# Patient Record
Sex: FEMALE | Race: BLACK OR AFRICAN AMERICAN | NOT HISPANIC OR LATINO
[De-identification: names, ages, dates, MRNs, and addresses within clinical notes are randomized per-mention and may not be internally consistent; named-entity substitution may affect disease eponyms.]

---

## 2018-09-19 PROBLEM — Z00.00 ENCOUNTER FOR PREVENTIVE HEALTH EXAMINATION: Status: ACTIVE | Noted: 2018-09-19

## 2020-07-11 ENCOUNTER — APPOINTMENT (OUTPATIENT)
Dept: ORTHOPEDIC SURGERY | Facility: CLINIC | Age: 55
End: 2020-07-11
Payer: MEDICAID

## 2020-07-11 VITALS
DIASTOLIC BLOOD PRESSURE: 103 MMHG | HEART RATE: 104 BPM | BODY MASS INDEX: 31.34 KG/M2 | HEIGHT: 66 IN | SYSTOLIC BLOOD PRESSURE: 177 MMHG | WEIGHT: 195 LBS

## 2020-07-11 VITALS — TEMPERATURE: 97 F

## 2020-07-11 DIAGNOSIS — M47.816 SPONDYLOSIS W/OUT MYELOPATHY OR RADICULOPATHY, LUMBAR REGION: ICD-10-CM

## 2020-07-11 PROCEDURE — 99204 OFFICE O/P NEW MOD 45 MIN: CPT

## 2020-07-11 PROCEDURE — 72100 X-RAY EXAM L-S SPINE 2/3 VWS: CPT

## 2020-07-11 NOTE — ADDENDUM
[FreeTextEntry1] : Documented by Malcolm Real acting as a scribe for Dr. Fadi Irving on 07/11/2020 . All medical record entries made by the Scribe were at my, Dr. Fadi Irving, direction and personally dictated by me on 07/11/2020 . I have reviewed the chart and agree that the record accurately reflects my personal performance of the history, physical exam, assessment and plan. I have also personally directed, reviewed, and agreed with the chart.

## 2020-07-11 NOTE — DISCUSSION/SUMMARY
[de-identified] : I have recommended that the pt continue with a conservative treatment plan before opting for a large lumbar fusion surgery. With a complete of low back pain, I have explained to this patient that this type of surgery may help with her pain profile, but will likely not 100% rectify her low back pain. Pt has been referred to physical therapy for decreased pain modalities, core strengthening modalities, soft tissue modalities, and physical modalities. Pt will be referred to pain management for epidural injections. Pt can take Tylenol / anti-inflammatories PRN pain. The pt may follow up here on a PRN basis. If her signs and symptoms persist, pt can follow up in 2-3 months.
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
## 2020-09-27 PROBLEM — Z00.00 ENCOUNTER FOR PREVENTIVE HEALTH EXAMINATION: Status: ACTIVE | Noted: 2020-09-27

## 2020-09-28 PROBLEM — Z78.9 DENIES ALCOHOL CONSUMPTION: Status: ACTIVE | Noted: 2020-09-28

## 2020-09-28 PROBLEM — I10 HTN (HYPERTENSION): Status: ACTIVE | Noted: 2020-09-28

## 2020-09-28 PROBLEM — E78.5 HLD (HYPERLIPIDEMIA): Status: ACTIVE | Noted: 2020-09-28

## 2020-09-28 PROBLEM — A63.0 CONDYLOMA: Status: ACTIVE | Noted: 2020-09-28

## 2020-09-28 PROBLEM — N89.8 VAGINAL LESION: Status: ACTIVE | Noted: 2020-09-28

## 2020-09-28 PROBLEM — E11.9 DIABETES TYPE 2, CONTROLLED: Status: ACTIVE | Noted: 2020-09-28

## 2020-09-28 RX ORDER — METFORMIN HYDROCHLORIDE 625 MG/1
TABLET ORAL
Refills: 0 | Status: ACTIVE | COMMUNITY

## 2020-09-28 RX ORDER — BISOPROLOL FUMARATE 5 MG/1
5 TABLET, FILM COATED ORAL
Refills: 0 | Status: ACTIVE | COMMUNITY

## 2020-09-28 RX ORDER — ATORVASTATIN CALCIUM 80 MG/1
TABLET, FILM COATED ORAL
Refills: 0 | Status: ACTIVE | COMMUNITY

## 2020-09-29 ENCOUNTER — APPOINTMENT (OUTPATIENT)
Dept: GYNECOLOGIC ONCOLOGY | Facility: CLINIC | Age: 55
End: 2020-09-29
Payer: MEDICAID

## 2020-09-29 VITALS — WEIGHT: 200 LBS | BODY MASS INDEX: 32.14 KG/M2 | HEIGHT: 66 IN

## 2020-09-29 DIAGNOSIS — N89.8 OTHER SPECIFIED NONINFLAMMATORY DISORDERS OF VAGINA: ICD-10-CM

## 2020-09-29 DIAGNOSIS — I10 ESSENTIAL (PRIMARY) HYPERTENSION: ICD-10-CM

## 2020-09-29 DIAGNOSIS — Z78.9 OTHER SPECIFIED HEALTH STATUS: ICD-10-CM

## 2020-09-29 DIAGNOSIS — E78.5 HYPERLIPIDEMIA, UNSPECIFIED: ICD-10-CM

## 2020-09-29 DIAGNOSIS — A63.0 ANOGENITAL (VENEREAL) WARTS: ICD-10-CM

## 2020-09-29 DIAGNOSIS — E11.9 TYPE 2 DIABETES MELLITUS W/OUT COMPLICATIONS: ICD-10-CM

## 2020-09-29 PROCEDURE — 76830 TRANSVAGINAL US NON-OB: CPT | Mod: 59

## 2020-09-29 PROCEDURE — 76857 US EXAM PELVIC LIMITED: CPT | Mod: 59

## 2020-09-29 PROCEDURE — 99205 OFFICE O/P NEW HI 60 MIN: CPT | Mod: 25

## 2020-09-30 NOTE — HISTORY OF PRESENT ILLNESS
[FreeTextEntry1] : 56yo  LMP 2020 presents with referral from Dr. Duenas for perineal lesion. Pt. reports lesion was first noticed about 6 months ago upon washing herself and was noted to be irritated with bleeding after wiping. She would like for it to be removed and was therefore referred here today. Per Dr. Floyd note, biopsy of lesion was taken in 2020 and was reported as atypical, however, he believes per examination lesion appears to most likely represent a condyloma. She also reports some pelvic pain off and on for the past few years but denies all other ROS. \par \par LPAP- , normal.\par LMammo- September, normal\par LColonoscopy- never, she has GI physici\par LBone Density Scan- never\par

## 2020-09-30 NOTE — CHIEF COMPLAINT
[FreeTextEntry1] : [facility]\par \par [facility] [phone] at [address] [place] [phone]\par

## 2020-09-30 NOTE — PHYSICAL EXAM
[Normal] : External genitalia: Normal, no lesions appreciated [Abnormal] : Anus, perineum, and rectum: Abnormal [de-identified] : raised lesions noted on the right vulva, right perineal fold, clitoral russo and perineal body.  [de-identified] : 1cm nodule likely a sebaceous cyst on the left perineal skin [de-identified] : Patient was interviewed and examined with chaperone present. Name of Chaperone: Jocelyn Eckert PA-C

## 2020-09-30 NOTE — LETTER BODY
[FreeTextEntry2] : Name: COBY VANG \par : Aug  6 1965 \par \par Date of Visit: Sep 29, 2020 \par \par Dear Dr. Duenas\par \par I recently saw our mutual patient, COBY VANG , in my office.\par \par Below, please see my findings.\par \par As always, it is my sincere pleasure to have the opportunity to participate in one of your patients' care. Please feel free to contact me with any questions or concerns that you may have regarding her care.\par \par Sincerely yours,\par \par Khanh Heller MD\par

## 2020-09-30 NOTE — ASSESSMENT
[FreeTextEntry1] : 56yo with perineal lesion. \par \par I discussed at length with the patient the risks, benefits, and alternatives to Wide local excision of R. vulvar, Right perineal fold, clitoral russo and perineal body lesions. Incision and drainage of left perineal skin.   She understands the risks to include (but not be limited to):  infection with need for hospitalization and bleeding with need for transfusion.  The patient agrees to proceed.  \par \par

## 2020-09-30 NOTE — END OF VISIT
[FreeTextEntry3] : Written by Nathaly Eckert PA-C, acting as scribe for Dr. Khanh Heller.\par  [FreeTextEntry2] : This note accurately reflects the work and decisions made by me.\par

## 2020-09-30 NOTE — REVIEW OF SYSTEMS
[Negative] : Musculoskeletal [Hematuria] : no hematuria [Dysuria] : no dysuria [Vaginal Discharge] : no vaginal discharge [Abn Vag Bleeding] : no abnormal vaginal bleeding [Dyspareunia] : no dyspareunia [Incontinence] : no incontinence [FreeTextEntry4] : perineal growth with occasional bleeding noted upon wiping, pelvic pain

## 2020-10-12 ENCOUNTER — RESULT REVIEW (OUTPATIENT)
Age: 55
End: 2020-10-12

## 2020-10-12 ENCOUNTER — OUTPATIENT (OUTPATIENT)
Dept: OUTPATIENT SERVICES | Facility: HOSPITAL | Age: 55
LOS: 1 days | End: 2020-10-12
Payer: COMMERCIAL

## 2020-10-12 VITALS
HEIGHT: 66 IN | HEART RATE: 85 BPM | DIASTOLIC BLOOD PRESSURE: 80 MMHG | TEMPERATURE: 98 F | SYSTOLIC BLOOD PRESSURE: 150 MMHG | RESPIRATION RATE: 16 BRPM | WEIGHT: 195.11 LBS

## 2020-10-12 DIAGNOSIS — Z01.818 ENCOUNTER FOR OTHER PREPROCEDURAL EXAMINATION: ICD-10-CM

## 2020-10-12 DIAGNOSIS — Z98.891 HISTORY OF UTERINE SCAR FROM PREVIOUS SURGERY: Chronic | ICD-10-CM

## 2020-10-12 DIAGNOSIS — Z29.9 ENCOUNTER FOR PROPHYLACTIC MEASURES, UNSPECIFIED: ICD-10-CM

## 2020-10-12 DIAGNOSIS — I10 ESSENTIAL (PRIMARY) HYPERTENSION: ICD-10-CM

## 2020-10-12 DIAGNOSIS — C51.9 MALIGNANT NEOPLASM OF VULVA, UNSPECIFIED: ICD-10-CM

## 2020-10-12 LAB
A1C WITH ESTIMATED AVERAGE GLUCOSE RESULT: 10.8 % — HIGH (ref 4–5.6)
ANION GAP SERPL CALC-SCNC: 12 MMOL/L — SIGNIFICANT CHANGE UP (ref 5–17)
BASOPHILS # BLD AUTO: 0.05 K/UL — SIGNIFICANT CHANGE UP (ref 0–0.2)
BASOPHILS NFR BLD AUTO: 0.7 % — SIGNIFICANT CHANGE UP (ref 0–2)
BLD GP AB SCN SERPL QL: SIGNIFICANT CHANGE UP
BUN SERPL-MCNC: 9 MG/DL — SIGNIFICANT CHANGE UP (ref 8–20)
CALCIUM SERPL-MCNC: 9.5 MG/DL — SIGNIFICANT CHANGE UP (ref 8.6–10.2)
CHLORIDE SERPL-SCNC: 100 MMOL/L — SIGNIFICANT CHANGE UP (ref 98–107)
CO2 SERPL-SCNC: 26 MMOL/L — SIGNIFICANT CHANGE UP (ref 22–29)
CREAT SERPL-MCNC: 0.4 MG/DL — LOW (ref 0.5–1.3)
EOSINOPHIL # BLD AUTO: 0.11 K/UL — SIGNIFICANT CHANGE UP (ref 0–0.5)
EOSINOPHIL NFR BLD AUTO: 1.6 % — SIGNIFICANT CHANGE UP (ref 0–6)
ESTIMATED AVERAGE GLUCOSE: 263 MG/DL — HIGH (ref 68–114)
GLUCOSE SERPL-MCNC: 229 MG/DL — HIGH (ref 70–99)
HCG SERPL-ACNC: <4 MIU/ML — SIGNIFICANT CHANGE UP
HCT VFR BLD CALC: 45.1 % — HIGH (ref 34.5–45)
HGB BLD-MCNC: 14 G/DL — SIGNIFICANT CHANGE UP (ref 11.5–15.5)
IMM GRANULOCYTES NFR BLD AUTO: 0.3 % — SIGNIFICANT CHANGE UP (ref 0–1.5)
LYMPHOCYTES # BLD AUTO: 2.65 K/UL — SIGNIFICANT CHANGE UP (ref 1–3.3)
LYMPHOCYTES # BLD AUTO: 37.5 % — SIGNIFICANT CHANGE UP (ref 13–44)
MCHC RBC-ENTMCNC: 26.4 PG — LOW (ref 27–34)
MCHC RBC-ENTMCNC: 31 GM/DL — LOW (ref 32–36)
MCV RBC AUTO: 85.1 FL — SIGNIFICANT CHANGE UP (ref 80–100)
MONOCYTES # BLD AUTO: 0.55 K/UL — SIGNIFICANT CHANGE UP (ref 0–0.9)
MONOCYTES NFR BLD AUTO: 7.8 % — SIGNIFICANT CHANGE UP (ref 2–14)
NEUTROPHILS # BLD AUTO: 3.69 K/UL — SIGNIFICANT CHANGE UP (ref 1.8–7.4)
NEUTROPHILS NFR BLD AUTO: 52.1 % — SIGNIFICANT CHANGE UP (ref 43–77)
PLATELET # BLD AUTO: 231 K/UL — SIGNIFICANT CHANGE UP (ref 150–400)
POTASSIUM SERPL-MCNC: 4.5 MMOL/L — SIGNIFICANT CHANGE UP (ref 3.5–5.3)
POTASSIUM SERPL-SCNC: 4.5 MMOL/L — SIGNIFICANT CHANGE UP (ref 3.5–5.3)
RBC # BLD: 5.3 M/UL — HIGH (ref 3.8–5.2)
RBC # FLD: 13.3 % — SIGNIFICANT CHANGE UP (ref 10.3–14.5)
SODIUM SERPL-SCNC: 138 MMOL/L — SIGNIFICANT CHANGE UP (ref 135–145)
WBC # BLD: 7.07 K/UL — SIGNIFICANT CHANGE UP (ref 3.8–10.5)
WBC # FLD AUTO: 7.07 K/UL — SIGNIFICANT CHANGE UP (ref 3.8–10.5)

## 2020-10-12 PROCEDURE — 71046 X-RAY EXAM CHEST 2 VIEWS: CPT | Mod: 26

## 2020-10-12 PROCEDURE — 93010 ELECTROCARDIOGRAM REPORT: CPT

## 2020-10-12 RX ORDER — CEFOTETAN DISODIUM 1 G
2 VIAL (EA) INJECTION ONCE
Refills: 0 | Status: DISCONTINUED | OUTPATIENT
Start: 2020-11-06 | End: 2020-11-20

## 2020-10-12 NOTE — H&P PST ADULT - NSICDXFAMILYHX_GEN_ALL_CORE_FT
FAMILY HISTORY:  Father  Still living? No  FH: diabetes mellitus, Age at diagnosis: Age Unknown    Sibling  Still living? Yes, Estimated age: Age Unknown  FH: diabetes mellitus, Age at diagnosis: Age Unknown

## 2020-10-12 NOTE — H&P PST ADULT - NSICDXPROBLEM_GEN_ALL_CORE_FT
PROBLEM DIAGNOSES  Problem: Need for prophylactic measure  Assessment and Plan: moderate risk surgical team to determine prophylactic intervention     Problem: Malignant neoplasm of vulva  Assessment and Plan: Vulvectomy, partial simple     Problem: Hypertension  Assessment and Plan: f/u with PCP for medical clearance

## 2020-10-12 NOTE — H&P PST ADULT - ASSESSMENT
54 y/o female postmenopausal,  with HTN. DM Type 2, HLD, Condyloma,  malignant neoplasm of vulva seen today pre-op for Vulvectomy, partial simple. Pt report perineal itching and lesion was noticed when she was washing herself. Pt was seen by her PCP and OB-GYN, underwent a Pap smear and biopsy in  with abnormal. Pt report intermittent perineal pain persist, denies bleeding, denies personal and family hx of malignant neoplasm. Seen today for  scheduled surgery with Dr. Urh. CAPRINI VTE 2.0 SCORE [CLOT updated 2019]    AGE RELATED RISK FACTORS                                                       MOBILITY RELATED FACTORS  [ x] Age 41-60 years                                            (1 Point)                    [ ] Bed rest                                                        (1 Point)  [ ] Age: 61-74 years                                           (2 Points)                  [ ] Plaster cast                                                   (2 Points)  [ ] Age= 75 years                                              (3 Points)                    [ ] Bed bound for more than 72 hours                 (2 Points)    DISEASE RELATED RISK FACTORS                                               GENDER SPECIFIC FACTORS  [ ] Edema in the lower extremities                       (1 Point)              [ ] Pregnancy                                                     (1 Point)  [ ] Varicose veins                                               (1 Point)                     [ ] Post-partum < 6 weeks                                   (1 Point)             [x ] BMI > 25 Kg/m2                                            (1 Point)                     [ ] Hormonal therapy  or oral contraception          (1 Point)                 [ ] Sepsis (in the previous month)                        (1 Point)               [ ] History of pregnancy complications                 (1 point)  [ ] Pneumonia or serious lung disease                                               [ ] Unexplained or recurrent                     (1 Point)           (in the previous month)                               (1 Point)  [ ] Abnormal pulmonary function test                     (1 Point)                 SURGERY RELATED RISK FACTORS  [ ] Acute myocardial infarction                              (1 Point)               [ ]  Section                                             (1 Point)  [ ] Congestive heart failure (in the previous month)  (1 Point)      [ ] Minor surgery                                                  (1 Point)   [ ] Inflammatory bowel disease                             (1 Point)               [ ] Arthroscopic surgery                                        (2 Points)  [ ] Central venous access                                      (2 Points)                [x ] General surgery lasting more than 45 minutes (2 points)  [ x] Malignancy- Present or previous                   (2 Points)                [ ] Elective arthroplasty                                         (5 points)    [ ] Stroke (in the previous month)                          (5 Points)                                                                                                                                                           HEMATOLOGY RELATED FACTORS                                                 TRAUMA RELATED RISK FACTORS  [ ] Prior episodes of VTE                                     (3 Points)                [ ] Fracture of the hip, pelvis, or leg                       (5 Points)  [ ] Positive family history for VTE                         (3 Points)             [ ] Acute spinal cord injury (in the previous month)  (5 Points)  [ ] Prothrombin 70076 A                                     (3 Points)               [ ] Paralysis  (less than 1 month)                             (5 Points)  [ ] Factor V Leiden                                             (3 Points)                  [ ] Multiple Trauma within 1 month                        (5 Points)  [ ] Lupus anticoagulants                                     (3 Points)                                                           [ ] Anticardiolipin antibodies                               (3 Points)                                                       [ ] High homocysteine in the blood                      (3 Points)                                             [ ] Other congenital or acquired thrombophilia      (3 Points)                                                [ ] Heparin induced thrombocytopenia                  (3 Points)                                     Total Score [     6     ]  OPIOID RISK TOOL    MICHELLE EACH BOX THAT APPLIES AND ADD TOTALS AT THE END    FAMILY HISTORY OF SUBSTANCE ABUSE                 FEMALE         MALE                                                Alcohol                             [  ]1 pt          [  ]3pts                                               Illegal Durgs                     [  ]2 pts        [  ]3pts                                               Rx Drugs                           [  ]4 pts        [  ]4 pts    PERSONAL HISTORY OF SUBSTANCE ABUSE                                                                                          Alcohol                             [  ]3 pts       [  ]3 pts                                               Illegal Drugs                     [  ]4 pts        [  ]4 pts                                               Rx Drugs                           [  ]5 pts        [  ]5 pts    AGE BETWEEN 16-45 YEARS                                      [  ]1 pt         [  ]1 pt    HISTORY OF PREADOLESCENT   SEXUAL ABUSE                                                             [  ]3 pts        [  ]0pts    PSYCHOLOGICAL DISEASE                     ADD, OCD, Bipolar, Schizophrenia        [  ]2 pts         [  ]2 pts                      Depression                                               [  x]1 pt           [  ]1 pt           SCORING TOTAL   (add numbers and type here)              (**1*)                                     A score of 3 or lower indicated LOW risk for future opioid abuse  A score of 4 to 7 indicated moderate risk for future opioid abuse  A score of 8 or higher indicates a high risk for opioid abuse 54 y/o female postmenopausal,  with HTN. DM Type 2, HLD, positive QuantiFeron gold,(2020 pt on  Rifampin), Condyloma,  malignant neoplasm of vulva seen today pre-op for Vulvectomy, partial simple. Pt report perineal itching and lesion was noticed when she was washing herself. Pt was seen by her PCP and OB-GYN, underwent a Pap smear and biopsy in  with abnormal. Pt report intermittent perineal pain persist, denies bleeding, denies personal and family hx of malignant neoplasm. Seen today for  scheduled surgery with Dr. Urh. CAPRINI VTE 2.0 SCORE [CLOT updated 2019]    AGE RELATED RISK FACTORS                                                       MOBILITY RELATED FACTORS  [ x] Age 41-60 years                                            (1 Point)                    [ ] Bed rest                                                        (1 Point)  [ ] Age: 61-74 years                                           (2 Points)                  [ ] Plaster cast                                                   (2 Points)  [ ] Age= 75 years                                              (3 Points)                    [ ] Bed bound for more than 72 hours                 (2 Points)    DISEASE RELATED RISK FACTORS                                               GENDER SPECIFIC FACTORS  [ ] Edema in the lower extremities                       (1 Point)              [ ] Pregnancy                                                     (1 Point)  [ ] Varicose veins                                               (1 Point)                     [ ] Post-partum < 6 weeks                                   (1 Point)             [x ] BMI > 25 Kg/m2                                            (1 Point)                     [ ] Hormonal therapy  or oral contraception          (1 Point)                 [ ] Sepsis (in the previous month)                        (1 Point)               [ ] History of pregnancy complications                 (1 point)  [ ] Pneumonia or serious lung disease                                               [ ] Unexplained or recurrent                     (1 Point)           (in the previous month)                               (1 Point)  [ ] Abnormal pulmonary function test                     (1 Point)                 SURGERY RELATED RISK FACTORS  [ ] Acute myocardial infarction                              (1 Point)               [ ]  Section                                             (1 Point)  [ ] Congestive heart failure (in the previous month)  (1 Point)      [ ] Minor surgery                                                  (1 Point)   [ ] Inflammatory bowel disease                             (1 Point)               [ ] Arthroscopic surgery                                        (2 Points)  [ ] Central venous access                                      (2 Points)                [x ] General surgery lasting more than 45 minutes (2 points)  [ x] Malignancy- Present or previous                   (2 Points)                [ ] Elective arthroplasty                                         (5 points)    [ ] Stroke (in the previous month)                          (5 Points)                                                                                                                                                           HEMATOLOGY RELATED FACTORS                                                 TRAUMA RELATED RISK FACTORS  [ ] Prior episodes of VTE                                     (3 Points)                [ ] Fracture of the hip, pelvis, or leg                       (5 Points)  [ ] Positive family history for VTE                         (3 Points)             [ ] Acute spinal cord injury (in the previous month)  (5 Points)  [ ] Prothrombin 26061 A                                     (3 Points)               [ ] Paralysis  (less than 1 month)                             (5 Points)  [ ] Factor V Leiden                                             (3 Points)                  [ ] Multiple Trauma within 1 month                        (5 Points)  [ ] Lupus anticoagulants                                     (3 Points)                                                           [ ] Anticardiolipin antibodies                               (3 Points)                                                       [ ] High homocysteine in the blood                      (3 Points)                                             [ ] Other congenital or acquired thrombophilia      (3 Points)                                                [ ] Heparin induced thrombocytopenia                  (3 Points)                                     Total Score [     6     ]  OPIOID RISK TOOL    MICHELLE EACH BOX THAT APPLIES AND ADD TOTALS AT THE END    FAMILY HISTORY OF SUBSTANCE ABUSE                 FEMALE         MALE                                                Alcohol                             [  ]1 pt          [  ]3pts                                               Illegal Durgs                     [  ]2 pts        [  ]3pts                                               Rx Drugs                           [  ]4 pts        [  ]4 pts    PERSONAL HISTORY OF SUBSTANCE ABUSE                                                                                          Alcohol                             [  ]3 pts       [  ]3 pts                                               Illegal Drugs                     [  ]4 pts        [  ]4 pts                                               Rx Drugs                           [  ]5 pts        [  ]5 pts    AGE BETWEEN 16-45 YEARS                                      [  ]1 pt         [  ]1 pt    HISTORY OF PREADOLESCENT   SEXUAL ABUSE                                                             [  ]3 pts        [  ]0pts    PSYCHOLOGICAL DISEASE                     ADD, OCD, Bipolar, Schizophrenia        [  ]2 pts         [  ]2 pts                      Depression                                               [  x]1 pt           [  ]1 pt           SCORING TOTAL   (add numbers and type here)              (**1*)                                     A score of 3 or lower indicated LOW risk for future opioid abuse  A score of 4 to 7 indicated moderate risk for future opioid abuse  A score of 8 or higher indicates a high risk for opioid abuse

## 2020-10-12 NOTE — H&P PST ADULT - NSICDXPASTMEDICALHX_GEN_ALL_CORE_FT
PAST MEDICAL HISTORY:  DM (diabetes mellitus)     Hyperlipemia     Hypertension     Malignant neoplasm of vulva      PAST MEDICAL HISTORY:  DM (diabetes mellitus) newly dx August 2020  with AIC 13.    Hyperlipemia     Hypertension     Malignant neoplasm of vulva      PAST MEDICAL HISTORY:  DM (diabetes mellitus) newly dx August 2020  with AIC 13.    Fibroid uterus     Hyperlipemia     Hypertension     Malignant neoplasm of vulva      PAST MEDICAL HISTORY:  DM (diabetes mellitus) newly dx August 2020  with AIC 13.    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

## 2020-10-12 NOTE — H&P PST ADULT - LAB RESULTS AND INTERPRETATION
10/12/2020. Pt lab reviewed, Hgb AIC 10.8, copies of lab results faxed to PCP and Dr. Heller's office today. Spoke to Mio in Dr. Heller's office and Kamryn in PCP office.

## 2020-10-12 NOTE — H&P PST ADULT - HISTORY OF PRESENT ILLNESS
56 y/o female postmenopausal,  with HTN. DM Type 2, HLD, Condyloma,  malignant neoplasm of vulva seen today pre-op for Vulvectomy, partial simple. Pt report perineal itching and lesion was noticed when she was washing herself. Pt was seen by her PCP and OB-GYN, underwent a Pap smear and biopsy in August with abnormal. Pt report intermittent perineal pain persist, denies bleeding. Seen today for pre-op for a scheduled surgery.             56 y/o female postmenopausal,  with HTN. DM Type 2, HLD, Condyloma,  malignant neoplasm of vulva seen today pre-op for Vulvectomy, partial simple. Pt report perineal itching and lesion was noticed when she was washing herself. Pt was seen by her PCP and OB-GYN, underwent a Pap smear and biopsy in  with abnormal. Pt report intermittent perineal pain persist, denies bleeding, denies personal and family hx of malignant neoplasm. Seen today for pre-op for a scheduled surgery.             56 y/o female postmenopausal,  with HTN. DM Type 2, HLD, Positive QuantiFeron gold(2020, pt on Rifampin),  Condyloma,  malignant neoplasm of vulva seen today pre-op for Vulvectomy, partial simple. Pt report perineal itching and lesion was noticed when she was washing herself. Pt was seen by her PCP and OB-GYN, underwent a Pap smear and biopsy in  with abnormal. Pt report intermittent perineal pain persist, denies bleeding, denies personal and family hx of malignant neoplasm. Seen today for pre-op for a scheduled surgery.

## 2020-10-15 DIAGNOSIS — Z01.818 ENCOUNTER FOR OTHER PREPROCEDURAL EXAMINATION: ICD-10-CM

## 2020-10-16 ENCOUNTER — APPOINTMENT (OUTPATIENT)
Dept: DISASTER EMERGENCY | Facility: CLINIC | Age: 55
End: 2020-10-16

## 2020-11-02 NOTE — ASU PATIENT PROFILE, ADULT - LEARNING ASSESSMENT (PATIENT) ADDITIONAL COMMENTS
pre-op instrucitons & covid testing reviewed. 11/2/20: LMOM. pre-op instrucitons & covid testing reviewed. 11/2/20: NAOMIOM. 11-3-2020 updated via phone pt denies any change pre-op instructions reviewed

## 2020-11-02 NOTE — ASU PATIENT PROFILE, ADULT - PMH
DM (diabetes mellitus)  newly dx August 2020  with AIC 13.  Fibroid uterus    Hyperlipemia    Hypertension    Malignant neoplasm of vulva    Positive QuantiFERON-TB Gold test  8/2020 on Rifampin

## 2020-11-03 ENCOUNTER — APPOINTMENT (OUTPATIENT)
Dept: DISASTER EMERGENCY | Facility: CLINIC | Age: 55
End: 2020-11-03

## 2020-11-04 LAB — SARS-COV-2 N GENE NPH QL NAA+PROBE: NOT DETECTED

## 2020-11-06 ENCOUNTER — OUTPATIENT (OUTPATIENT)
Dept: INPATIENT UNIT | Facility: HOSPITAL | Age: 55
LOS: 1 days | End: 2020-11-06
Payer: COMMERCIAL

## 2020-11-06 ENCOUNTER — RESULT REVIEW (OUTPATIENT)
Age: 55
End: 2020-11-06

## 2020-11-06 VITALS
HEART RATE: 80 BPM | OXYGEN SATURATION: 97 % | DIASTOLIC BLOOD PRESSURE: 92 MMHG | RESPIRATION RATE: 16 BRPM | SYSTOLIC BLOOD PRESSURE: 150 MMHG

## 2020-11-06 VITALS
HEIGHT: 66 IN | SYSTOLIC BLOOD PRESSURE: 128 MMHG | WEIGHT: 195.11 LBS | OXYGEN SATURATION: 100 % | HEART RATE: 105 BPM | DIASTOLIC BLOOD PRESSURE: 79 MMHG | RESPIRATION RATE: 18 BRPM | TEMPERATURE: 98 F

## 2020-11-06 DIAGNOSIS — Z98.891 HISTORY OF UTERINE SCAR FROM PREVIOUS SURGERY: Chronic | ICD-10-CM

## 2020-11-06 DIAGNOSIS — C51.9 MALIGNANT NEOPLASM OF VULVA, UNSPECIFIED: ICD-10-CM

## 2020-11-06 LAB
GLUCOSE BLDC GLUCOMTR-MCNC: 149 MG/DL — HIGH (ref 70–99)
GLUCOSE BLDC GLUCOMTR-MCNC: 205 MG/DL — HIGH (ref 70–99)

## 2020-11-06 PROCEDURE — 88305 TISSUE EXAM BY PATHOLOGIST: CPT | Mod: 26

## 2020-11-06 PROCEDURE — 88309 TISSUE EXAM BY PATHOLOGIST: CPT | Mod: 26

## 2020-11-06 PROCEDURE — 56620 VULVECTOMY SIMPLE PARTIAL: CPT

## 2020-11-06 RX ORDER — HYDROMORPHONE HYDROCHLORIDE 2 MG/ML
0.5 INJECTION INTRAMUSCULAR; INTRAVENOUS; SUBCUTANEOUS
Refills: 0 | Status: DISCONTINUED | OUTPATIENT
Start: 2020-11-06 | End: 2020-11-06

## 2020-11-06 RX ORDER — ACETAMINOPHEN 500 MG
3 TABLET ORAL
Qty: 84 | Refills: 0
Start: 2020-11-06 | End: 2020-11-12

## 2020-11-06 RX ORDER — FENTANYL CITRATE 50 UG/ML
25 INJECTION INTRAVENOUS
Refills: 0 | Status: DISCONTINUED | OUTPATIENT
Start: 2020-11-06 | End: 2020-11-06

## 2020-11-06 RX ORDER — SODIUM CHLORIDE 9 MG/ML
1000 INJECTION, SOLUTION INTRAVENOUS
Refills: 0 | Status: DISCONTINUED | OUTPATIENT
Start: 2020-11-06 | End: 2020-11-06

## 2020-11-06 RX ORDER — ONDANSETRON 8 MG/1
4 TABLET, FILM COATED ORAL ONCE
Refills: 0 | Status: DISCONTINUED | OUTPATIENT
Start: 2020-11-06 | End: 2020-11-06

## 2020-11-06 RX ADMIN — HYDROMORPHONE HYDROCHLORIDE 0.5 MILLIGRAM(S): 2 INJECTION INTRAMUSCULAR; INTRAVENOUS; SUBCUTANEOUS at 13:47

## 2020-11-06 NOTE — BRIEF OPERATIVE NOTE - NSICDXBRIEFPROCEDURE_GEN_ALL_CORE_FT
PROCEDURES:  Wide local excision of vulva with colposcopy of vulva 06-Nov-2020 13:03:27  Ghazla Ferrera  Exam under anesthesia of perineum 06-Nov-2020 13:01:30  Ghazal Ferrera

## 2020-11-06 NOTE — ASU DISCHARGE PLAN (ADULT/PEDIATRIC) - CARE PROVIDER_API CALL
Khanh Heller)  [facility]  [address] [place] [address]  Phone: [phone]  Fax: [phone]  Follow Up Time: 2 weeks

## 2020-11-06 NOTE — ASU DISCHARGE PLAN (ADULT/PEDIATRIC) - MEDICATION INSTRUCTIONS
Take 500mg of naproxen every 12 hours for one week. Take up to 975mg of tylenol every 6 hours as needed. If you need more pain medication please call the office

## 2020-11-06 NOTE — BRIEF OPERATIVE NOTE - SPECIMENS
1. Clitoral russo 2. Right vulva at 8 o'clock 3. Right vulva at 9 o'clock 4. Vulva at 6 o'clock 5. Right thigh 6. Left thigh

## 2020-11-11 LAB — SURGICAL PATHOLOGY STUDY: SIGNIFICANT CHANGE UP

## 2020-11-17 ENCOUNTER — APPOINTMENT (OUTPATIENT)
Dept: GYNECOLOGIC ONCOLOGY | Facility: CLINIC | Age: 55
End: 2020-11-17
Payer: MEDICAID

## 2020-11-17 ENCOUNTER — APPOINTMENT (OUTPATIENT)
Dept: GYNECOLOGIC ONCOLOGY | Facility: CLINIC | Age: 55
End: 2020-11-17

## 2020-11-17 DIAGNOSIS — R10.2 PELVIC AND PERINEAL PAIN: ICD-10-CM

## 2020-11-17 DIAGNOSIS — N90.0 MILD VULVAR DYSPLASIA: ICD-10-CM

## 2020-11-17 PROCEDURE — 99024 POSTOP FOLLOW-UP VISIT: CPT

## 2020-11-17 RX ORDER — INDOMETHACIN 75 MG/1
75 CAPSULE, EXTENDED RELEASE ORAL
Qty: 14 | Refills: 0 | Status: ACTIVE | COMMUNITY
Start: 2020-10-06

## 2020-11-17 RX ORDER — ATORVASTATIN CALCIUM 40 MG/1
40 TABLET, FILM COATED ORAL
Qty: 30 | Refills: 0 | Status: ACTIVE | COMMUNITY
Start: 2020-09-14

## 2020-11-17 RX ORDER — CANAGLIFLOZIN 100 MG/1
100 TABLET, FILM COATED ORAL
Qty: 30 | Refills: 0 | Status: ACTIVE | COMMUNITY
Start: 2020-10-21

## 2020-11-17 RX ORDER — TERCONAZOLE 4 MG/G
0.4 CREAM VAGINAL
Qty: 45 | Refills: 0 | Status: ACTIVE | COMMUNITY
Start: 2020-07-22

## 2020-11-17 RX ORDER — LANCETS
EACH MISCELLANEOUS
Qty: 100 | Refills: 0 | Status: ACTIVE | COMMUNITY
Start: 2020-08-03

## 2020-11-17 RX ORDER — BLOOD-GLUCOSE METER
W/DEVICE KIT MISCELLANEOUS
Qty: 1 | Refills: 0 | Status: ACTIVE | COMMUNITY
Start: 2020-08-03

## 2020-11-17 RX ORDER — ALBUTEROL SULFATE 90 UG/1
108 (90 BASE) INHALANT RESPIRATORY (INHALATION)
Qty: 8 | Refills: 0 | Status: ACTIVE | COMMUNITY
Start: 2020-09-23

## 2020-11-17 RX ORDER — GENTAMICIN SULFATE 1 MG/G
0.1 CREAM TOPICAL
Qty: 15 | Refills: 0 | Status: ACTIVE | COMMUNITY
Start: 2020-08-17

## 2020-11-17 RX ORDER — BLOOD SUGAR DIAGNOSTIC
STRIP MISCELLANEOUS
Qty: 50 | Refills: 0 | Status: ACTIVE | COMMUNITY
Start: 2020-08-03

## 2020-11-17 RX ORDER — NAPROXEN SODIUM 550 MG/1
550 TABLET ORAL
Qty: 40 | Refills: 0 | Status: ACTIVE | COMMUNITY
Start: 2020-07-22

## 2020-11-17 RX ORDER — MONTELUKAST 10 MG/1
10 TABLET, FILM COATED ORAL
Qty: 30 | Refills: 0 | Status: ACTIVE | COMMUNITY
Start: 2020-09-23

## 2020-11-17 RX ORDER — METRONIDAZOLE 7.5 MG/G
0.75 GEL VAGINAL
Qty: 70 | Refills: 0 | Status: ACTIVE | COMMUNITY
Start: 2020-07-28

## 2020-11-17 RX ORDER — ALCOHOL ANTISEPTIC PADS
70 PADS, MEDICATED (EA) TOPICAL
Qty: 100 | Refills: 0 | Status: ACTIVE | COMMUNITY
Start: 2020-08-03

## 2020-11-17 RX ORDER — HYDROCHLOROTHIAZIDE 12.5 MG/1
12.5 TABLET ORAL
Qty: 30 | Refills: 0 | Status: ACTIVE | COMMUNITY
Start: 2020-11-09

## 2020-11-17 RX ORDER — PEN NEEDLE, DIABETIC 29 G X1/2"
31G X 5 MM NEEDLE, DISPOSABLE MISCELLANEOUS
Qty: 100 | Refills: 0 | Status: ACTIVE | COMMUNITY
Start: 2020-08-03

## 2020-11-17 RX ORDER — INSULIN GLARGINE 100 [IU]/ML
100 INJECTION, SOLUTION SUBCUTANEOUS
Qty: 15 | Refills: 0 | Status: ACTIVE | COMMUNITY
Start: 2020-10-14

## 2020-11-17 RX ORDER — VALSARTAN 320 MG/1
320 TABLET, COATED ORAL
Qty: 30 | Refills: 0 | Status: ACTIVE | COMMUNITY
Start: 2020-09-23

## 2020-11-17 NOTE — REASON FOR VISIT
[Post Op] : post op visit [de-identified] : 11/6/20 [de-identified] : Vulva WLE x 7 [de-identified] : Pt presents for post-op check. She reports doing well but having spotting since surgery. Not a lot of bleeding, but she describes pain and discomfort with sitting down.

## 2020-11-17 NOTE — DISCUSSION/SUMMARY
[Doing Well] : is doing well [No Sign of Infection] : is showing no signs of infection [Fair Pain Control] : has fair pain control

## 2020-11-17 NOTE — ASSESSMENT
[FreeTextEntry1] : Pt is a 54 yo s/p vulvar WLE with spotting of incisions. No fevers/chills, still having discomfort sitting down which is usually with vulvar surgery. Discussed that if pain gets worse to follow up for evaluation. Should improve in the next week.

## 2023-02-24 ENCOUNTER — OFFICE (OUTPATIENT)
Dept: URBAN - METROPOLITAN AREA CLINIC 6 | Facility: CLINIC | Age: 58
Setting detail: OPHTHALMOLOGY
End: 2023-02-24
Payer: MEDICAID

## 2023-02-24 DIAGNOSIS — H25.13: ICD-10-CM

## 2023-02-24 DIAGNOSIS — E11.3313: ICD-10-CM

## 2023-02-24 PROCEDURE — 92014 COMPRE OPH EXAM EST PT 1/>: CPT | Performed by: OPHTHALMOLOGY

## 2023-02-24 ASSESSMENT — REFRACTION_CURRENTRX
OD_SPHERE: -1.00
OD_CYLINDER: SPH
OD_OVR_VA: 20/
OD_ADD: +1.25
OD_OVR_VA: 20/
OS_AXIS: 057
OS_AXIS: 110
OS_CYLINDER: -0.25
OS_SPHERE: -0.25
OS_OVR_VA: 20/
OD_ADD: +2.00
OS_OVR_VA: 20/
OS_CYLINDER: -0.50
OD_SPHERE: -0.75
OD_CYLINDER: -0.50
OD_AXIS: 080
OS_VPRISM_DIRECTION: PROGS
OS_VPRISM_DIRECTION: PROGS
OS_ADD: +2.00
OD_VPRISM_DIRECTION: PROGS
OS_SPHERE: PLANO
OD_VPRISM_DIRECTION: PROGS
OS_ADD: +1.25

## 2023-02-24 ASSESSMENT — KERATOMETRY
OS_AXISANGLE_DEGREES: 32
OD_K2POWER_DIOPTERS: 43.50
OD_AXISANGLE_DEGREES: 98
OS_K2POWER_DIOPTERS: 43.50
OS_K1POWER_DIOPTERS: 42.75
OD_K1POWER_DIOPTERS: 42.75

## 2023-02-24 ASSESSMENT — REFRACTION_MANIFEST
OS_ADD: +2.00
OD_CYLINDER: -0.50
OD_ADD: +2.00
OD_CYLINDER: -0.50
OS_CYLINDER: -0.75
OD_SPHERE: +0.25
OD_VA1: 20/25+2
OS_SPHERE: PLANO
OD_AXIS: 65
OD_VA1: 20/50
OD_AXIS: 080
OD_SPHERE: -0.75
OS_VA1: 20/40
OS_AXIS: 110
OS_SPHERE: +0.50
OS_CYLINDER: -0.50
OS_AXIS: 110
OS_VA1: 20/25

## 2023-02-24 ASSESSMENT — SPHEQUIV_DERIVED
OS_SPHEQUIV: 0.125
OD_SPHEQUIV: -1
OD_SPHEQUIV: 0
OD_SPHEQUIV: 0
OS_SPHEQUIV: 0.125

## 2023-02-24 ASSESSMENT — CONFRONTATIONAL VISUAL FIELD TEST (CVF)
OD_FINDINGS: FULL
OS_FINDINGS: FULL

## 2023-02-24 ASSESSMENT — REFRACTION_AUTOREFRACTION
OS_CYLINDER: -0.75
OD_AXIS: 65
OS_AXIS: 110
OD_SPHERE: +0.25
OD_CYLINDER: -0.50
OS_SPHERE: +0.50

## 2023-02-24 ASSESSMENT — AXIALLENGTH_DERIVED
OS_AL: 23.6813
OD_AL: 24.1313
OS_AL: 23.6813
OD_AL: 23.7305
OD_AL: 23.7305

## 2023-02-24 ASSESSMENT — VISUAL ACUITY
OS_BCVA: 20/60
OD_BCVA: 20/70-1

## 2023-04-06 ENCOUNTER — OFFICE (OUTPATIENT)
Dept: URBAN - METROPOLITAN AREA CLINIC 63 | Facility: CLINIC | Age: 58
Setting detail: OPHTHALMOLOGY
End: 2023-04-06
Payer: MEDICAID

## 2023-04-06 DIAGNOSIS — E11.3313: ICD-10-CM

## 2023-04-06 DIAGNOSIS — E11.3311: ICD-10-CM

## 2023-04-06 PROCEDURE — 92134 CPTRZ OPH DX IMG PST SGM RTA: CPT | Performed by: SPECIALIST

## 2023-04-06 PROCEDURE — 92012 INTRM OPH EXAM EST PATIENT: CPT | Performed by: SPECIALIST

## 2023-04-06 PROCEDURE — 92235 FLUORESCEIN ANGRPH MLTIFRAME: CPT | Performed by: SPECIALIST

## 2023-04-06 PROCEDURE — 67210 TREATMENT OF RETINAL LESION: CPT | Performed by: SPECIALIST

## 2023-04-06 ASSESSMENT — CONFRONTATIONAL VISUAL FIELD TEST (CVF)
OD_FINDINGS: FULL
OS_FINDINGS: FULL

## 2023-04-06 ASSESSMENT — KERATOMETRY
OS_AXISANGLE_DEGREES: 32
OS_K2POWER_DIOPTERS: 43.50
OS_K1POWER_DIOPTERS: 42.75
OD_AXISANGLE_DEGREES: 98
OD_K2POWER_DIOPTERS: 43.50
OD_K1POWER_DIOPTERS: 42.75

## 2023-04-06 ASSESSMENT — REFRACTION_AUTOREFRACTION
OS_AXIS: 110
OD_SPHERE: +0.25
OD_CYLINDER: -0.50
OD_AXIS: 65
OS_CYLINDER: -0.75
OS_SPHERE: +0.50

## 2023-04-06 ASSESSMENT — REFRACTION_CURRENTRX
OD_ADD: +1.25
OS_OVR_VA: 20/
OD_VPRISM_DIRECTION: PROGS
OS_SPHERE: -0.25
OD_CYLINDER: -0.50
OS_AXIS: 110
OD_AXIS: 080
OS_SPHERE: PLANO
OS_ADD: +2.00
OD_OVR_VA: 20/
OS_CYLINDER: -0.25
OD_SPHERE: -1.00
OS_OVR_VA: 20/
OS_VPRISM_DIRECTION: PROGS
OD_VPRISM_DIRECTION: PROGS
OD_ADD: +2.00
OS_ADD: +1.25
OD_SPHERE: -0.75
OS_AXIS: 057
OD_CYLINDER: SPH
OS_CYLINDER: -0.50
OS_VPRISM_DIRECTION: PROGS
OD_OVR_VA: 20/

## 2023-04-06 ASSESSMENT — AXIALLENGTH_DERIVED
OS_AL: 23.6813
OD_AL: 23.7305

## 2023-04-06 ASSESSMENT — VISUAL ACUITY
OS_BCVA: 20/50
OD_BCVA: 20/60

## 2023-04-06 ASSESSMENT — SPHEQUIV_DERIVED
OS_SPHEQUIV: 0.125
OD_SPHEQUIV: 0

## 2023-05-04 ENCOUNTER — OFFICE (OUTPATIENT)
Dept: URBAN - METROPOLITAN AREA CLINIC 63 | Facility: CLINIC | Age: 58
Setting detail: OPHTHALMOLOGY
End: 2023-05-04
Payer: MEDICAID

## 2023-05-04 DIAGNOSIS — E11.3311: ICD-10-CM

## 2023-05-04 DIAGNOSIS — E11.3313: ICD-10-CM

## 2023-05-04 PROBLEM — E11.3312 DM TYPE 2; RIGHT MOD WITH ME, LEFT MOD WITH ME: Status: ACTIVE | Noted: 2023-04-06

## 2023-05-04 PROCEDURE — 92134 CPTRZ OPH DX IMG PST SGM RTA: CPT | Performed by: SPECIALIST

## 2023-05-04 PROCEDURE — 67028 INJECTION EYE DRUG: CPT | Performed by: SPECIALIST

## 2023-05-04 ASSESSMENT — KERATOMETRY
OD_K2POWER_DIOPTERS: 43.50
OS_AXISANGLE_DEGREES: 32
OS_K1POWER_DIOPTERS: 42.75
OD_K1POWER_DIOPTERS: 42.75
OD_AXISANGLE_DEGREES: 98
OS_K2POWER_DIOPTERS: 43.50

## 2023-05-04 ASSESSMENT — REFRACTION_CURRENTRX
OD_ADD: +2.00
OD_ADD: +1.25
OS_OVR_VA: 20/
OD_VPRISM_DIRECTION: PROGS
OD_SPHERE: -0.75
OD_VPRISM_DIRECTION: PROGS
OD_SPHERE: -1.00
OD_CYLINDER: SPH
OS_AXIS: 057
OD_AXIS: 080
OS_CYLINDER: -0.50
OS_SPHERE: -0.25
OS_VPRISM_DIRECTION: PROGS
OS_ADD: +2.00
OS_SPHERE: PLANO
OS_VPRISM_DIRECTION: PROGS
OD_OVR_VA: 20/
OD_OVR_VA: 20/
OS_CYLINDER: -0.25
OS_OVR_VA: 20/
OS_ADD: +1.25
OD_CYLINDER: -0.50
OS_AXIS: 110

## 2023-05-04 ASSESSMENT — REFRACTION_AUTOREFRACTION
OS_CYLINDER: -0.75
OS_AXIS: 110
OD_SPHERE: +0.25
OD_AXIS: 65
OS_SPHERE: +0.50
OD_CYLINDER: -0.50

## 2023-05-04 ASSESSMENT — SPHEQUIV_DERIVED
OD_SPHEQUIV: 0
OS_SPHEQUIV: 0.125

## 2023-05-04 ASSESSMENT — TONOMETRY
OD_IOP_MMHG: 21
OS_IOP_MMHG: 21

## 2023-05-04 ASSESSMENT — AXIALLENGTH_DERIVED
OS_AL: 23.6813
OD_AL: 23.7305

## 2023-05-04 ASSESSMENT — VISUAL ACUITY
OS_BCVA: 20/40
OD_BCVA: 20/25

## 2023-05-04 ASSESSMENT — CONFRONTATIONAL VISUAL FIELD TEST (CVF)
OD_FINDINGS: FULL
OS_FINDINGS: FULL

## 2023-06-14 ENCOUNTER — OFFICE (OUTPATIENT)
Dept: URBAN - METROPOLITAN AREA CLINIC 63 | Facility: CLINIC | Age: 58
Setting detail: OPHTHALMOLOGY
End: 2023-06-14
Payer: MEDICAID

## 2023-06-14 DIAGNOSIS — E11.3313: ICD-10-CM

## 2023-06-14 DIAGNOSIS — E11.3312: ICD-10-CM

## 2023-06-14 PROCEDURE — 92134 CPTRZ OPH DX IMG PST SGM RTA: CPT | Performed by: SPECIALIST

## 2023-06-14 PROCEDURE — 67210 TREATMENT OF RETINAL LESION: CPT | Performed by: SPECIALIST

## 2023-06-14 ASSESSMENT — TONOMETRY
OS_IOP_MMHG: 20
OD_IOP_MMHG: 20

## 2023-06-14 ASSESSMENT — SPHEQUIV_DERIVED
OD_SPHEQUIV: 0
OS_SPHEQUIV: 0.125

## 2023-06-14 ASSESSMENT — KERATOMETRY
OD_K1POWER_DIOPTERS: 42.75
OD_AXISANGLE_DEGREES: 98
OS_K2POWER_DIOPTERS: 43.50
OD_K2POWER_DIOPTERS: 43.50
OS_K1POWER_DIOPTERS: 42.75
OS_AXISANGLE_DEGREES: 32

## 2023-06-14 ASSESSMENT — REFRACTION_AUTOREFRACTION
OS_CYLINDER: -0.75
OS_SPHERE: +0.50
OS_AXIS: 110
OD_SPHERE: +0.25
OD_AXIS: 65
OD_CYLINDER: -0.50

## 2023-06-14 ASSESSMENT — AXIALLENGTH_DERIVED
OD_AL: 23.7305
OS_AL: 23.6813

## 2023-06-14 ASSESSMENT — VISUAL ACUITY
OS_BCVA: 20/50
OD_BCVA: 20/40

## 2023-06-14 ASSESSMENT — CONFRONTATIONAL VISUAL FIELD TEST (CVF)
OS_FINDINGS: FULL
OD_FINDINGS: FULL

## 2023-07-18 ENCOUNTER — OFFICE (OUTPATIENT)
Dept: URBAN - METROPOLITAN AREA CLINIC 6 | Facility: CLINIC | Age: 58
Setting detail: OPHTHALMOLOGY
End: 2023-07-18
Payer: MEDICAID

## 2023-07-18 DIAGNOSIS — H52.4: ICD-10-CM

## 2023-07-18 DIAGNOSIS — E11.3313: ICD-10-CM

## 2023-07-18 DIAGNOSIS — H25.13: ICD-10-CM

## 2023-07-18 PROCEDURE — 92014 COMPRE OPH EXAM EST PT 1/>: CPT | Performed by: OPHTHALMOLOGY

## 2023-07-18 PROCEDURE — 92015 DETERMINE REFRACTIVE STATE: CPT | Performed by: OPHTHALMOLOGY

## 2023-07-18 ASSESSMENT — REFRACTION_MANIFEST
OS_CYLINDER: -1.50
OS_VA1: 20/30
OD_VA1: 20/60
OS_SPHERE: +1.00
OS_VA1: 20/30
OS_CYLINDER: -1.50
OD_CYLINDER: -1.00
OD_SPHERE: +1.00
OD_SPHERE: +1.00
OD_VA1: 20/60
OS_ADD: +2.00
OD_CYLINDER: -1.00
OD_ADD: +2.00
OS_SPHERE: +1.00
OD_AXIS: 90
OS_AXIS: 95
OS_AXIS: 95
OD_AXIS: 90

## 2023-07-18 ASSESSMENT — REFRACTION_CURRENTRX
OS_AXIS: 102
OS_SPHERE: PLANO
OS_CYLINDER: -0.50
OD_ADD: +2.00
OD_OVR_VA: 20/
OD_SPHERE: -0.75
OD_CYLINDER: -0.50
OS_ADD: +2.00
OS_OVR_VA: 20/
OD_AXIS: 79
OD_VPRISM_DIRECTION: PROGS
OS_VPRISM_DIRECTION: PROGS

## 2023-07-18 ASSESSMENT — SPHEQUIV_DERIVED
OD_SPHEQUIV: 0.5
OS_SPHEQUIV: 0.25
OD_SPHEQUIV: 0.5
OS_SPHEQUIV: 0.25
OD_SPHEQUIV: 0.5
OS_SPHEQUIV: 0.25

## 2023-07-18 ASSESSMENT — AXIALLENGTH_DERIVED
OD_AL: 23.6267
OS_AL: 23.7248
OD_AL: 23.6267
OD_AL: 23.6267

## 2023-07-18 ASSESSMENT — TONOMETRY
OS_IOP_MMHG: 19
OD_IOP_MMHG: 20

## 2023-07-18 ASSESSMENT — REFRACTION_AUTOREFRACTION
OS_CYLINDER: -1.50
OD_SPHERE: +1.00
OD_AXIS: 90
OS_SPHERE: +1.00
OD_CYLINDER: -1.00
OS_AXIS: 95

## 2023-07-18 ASSESSMENT — KERATOMETRY
OD_AXISANGLE_DEGREES: 84
OS_K2POWER_DIOPTERS: 43.00
OD_K1POWER_DIOPTERS: 42.75
OS_K1POWER_DIOPTERS: 42.75
OD_K2POWER_DIOPTERS: 43.00
OS_AXISANGLE_DEGREES: 16

## 2023-07-18 ASSESSMENT — CONFRONTATIONAL VISUAL FIELD TEST (CVF)
OS_FINDINGS: FULL
OD_FINDINGS: FULL

## 2023-07-18 ASSESSMENT — VISUAL ACUITY
OD_BCVA: 20/50
OS_BCVA: 20/100

## 2023-07-29 ENCOUNTER — OFFICE (OUTPATIENT)
Dept: URBAN - METROPOLITAN AREA CLINIC 94 | Facility: CLINIC | Age: 58
Setting detail: OPHTHALMOLOGY
End: 2023-07-29
Payer: MEDICAID

## 2023-07-29 DIAGNOSIS — E11.3311: ICD-10-CM

## 2023-07-29 PROBLEM — E11.3313 DM TYPE 2; RIGHT MOD WITH ME, LEFT MOD WITH ME: Status: ACTIVE | Noted: 2023-07-29

## 2023-07-29 PROBLEM — E11.3312 DM TYPE 2; RIGHT MOD WITH ME, LEFT MOD WITH ME: Status: ACTIVE | Noted: 2023-07-29

## 2023-07-29 PROCEDURE — 92134 CPTRZ OPH DX IMG PST SGM RTA: CPT | Performed by: SPECIALIST

## 2023-07-29 PROCEDURE — 67028 INJECTION EYE DRUG: CPT | Performed by: SPECIALIST

## 2023-07-29 ASSESSMENT — VISUAL ACUITY
OS_BCVA: 20/50+1
OD_BCVA: 20/30

## 2023-07-29 ASSESSMENT — KERATOMETRY
OS_AXISANGLE_DEGREES: 16
OS_K2POWER_DIOPTERS: 43.00
OS_K1POWER_DIOPTERS: 42.75
OD_K2POWER_DIOPTERS: 43.00
OD_K1POWER_DIOPTERS: 42.75
OD_AXISANGLE_DEGREES: 84

## 2023-07-29 ASSESSMENT — AXIALLENGTH_DERIVED
OS_AL: 23.7248
OD_AL: 23.6267

## 2023-07-29 ASSESSMENT — SPHEQUIV_DERIVED
OD_SPHEQUIV: 0.5
OS_SPHEQUIV: 0.25

## 2023-07-29 ASSESSMENT — REFRACTION_AUTOREFRACTION
OD_SPHERE: +1.00
OS_AXIS: 95
OS_SPHERE: +1.00
OD_CYLINDER: -1.00
OS_CYLINDER: -1.50
OD_AXIS: 90

## 2023-07-29 ASSESSMENT — CONFRONTATIONAL VISUAL FIELD TEST (CVF)
OD_FINDINGS: FULL
OS_FINDINGS: FULL

## 2023-10-05 ENCOUNTER — EMERGENCY (EMERGENCY)
Facility: HOSPITAL | Age: 58
LOS: 1 days | Discharge: HOME | End: 2023-10-05
Attending: STUDENT IN AN ORGANIZED HEALTH CARE EDUCATION/TRAINING PROGRAM
Payer: COMMERCIAL

## 2023-10-05 VITALS
WEIGHT: 214.29 LBS | TEMPERATURE: 98 F | SYSTOLIC BLOOD PRESSURE: 154 MMHG | RESPIRATION RATE: 20 BRPM | HEART RATE: 94 BPM | DIASTOLIC BLOOD PRESSURE: 85 MMHG | OXYGEN SATURATION: 97 %

## 2023-10-05 DIAGNOSIS — Z98.891 HISTORY OF UTERINE SCAR FROM PREVIOUS SURGERY: Chronic | ICD-10-CM

## 2023-10-05 PROCEDURE — 73562 X-RAY EXAM OF KNEE 3: CPT

## 2023-10-05 PROCEDURE — 73590 X-RAY EXAM OF LOWER LEG: CPT | Mod: 26,RT

## 2023-10-05 PROCEDURE — 73590 X-RAY EXAM OF LOWER LEG: CPT

## 2023-10-05 PROCEDURE — 73562 X-RAY EXAM OF KNEE 3: CPT | Mod: 26,RT

## 2023-10-05 PROCEDURE — 99284 EMERGENCY DEPT VISIT MOD MDM: CPT

## 2023-10-05 RX ORDER — ACETAMINOPHEN 500 MG
650 TABLET ORAL ONCE
Refills: 0 | Status: COMPLETED | OUTPATIENT
Start: 2023-10-05 | End: 2023-10-05

## 2023-10-05 RX ORDER — CYCLOBENZAPRINE HYDROCHLORIDE 10 MG/1
10 TABLET, FILM COATED ORAL ONCE
Refills: 0 | Status: COMPLETED | OUTPATIENT
Start: 2023-10-05 | End: 2023-10-05

## 2023-10-05 RX ADMIN — CYCLOBENZAPRINE HYDROCHLORIDE 10 MILLIGRAM(S): 10 TABLET, FILM COATED ORAL at 09:45

## 2023-10-05 RX ADMIN — Medication 650 MILLIGRAM(S): at 09:45

## 2023-10-05 NOTE — ED PROVIDER NOTE - PHYSICAL EXAMINATION
GEN: Pt in NAD, A&O x3. GCS 15  EYES: Sclera white w/o injection  ENT: Head NCAT. Neck supple FROM.   RESP: No chest wall tenderness, CTA b/l, no wheezes, rales, or rhonchi.   CARDIAC: RRR, clear distinct S1, S2, no murmurs, gallops, or rubs.   VASC: 2+ radial and dorsalis pedis pulses b/l. No edema of the lower extremities.  NEURO: Sensation LE Lt > Rt b/l. 5/5 strength LE b/l. Normal gait and gross cerebellar functioning.  MSK: Bruising noted above right knee. No joint erythema or obvious deformities. Spine without obvious deformity. No midline or paraspinal tenderness. FROM w/o pain of upper extremities, b/l hip joint, Lt Lower extremity, and Rt ankle. FROM w/ pain Right knee. No laxity of rt knee though pain with varus/valgus/anterior draw and posterior draw.  UE & LE compartments soft and compressible b/l. GEN: Pt in NAD, A&O x3. GCS 15  EYES: Sclera white w/o injection  ENT: Head NCAT. Neck supple FROM.   RESP: No chest wall tenderness, CTA b/l, no wheezes, rales, or rhonchi.   CARDIAC: RRR, clear distinct S1, S2, no murmurs, gallops, or rubs.   VASC: 2+ radial and dorsalis pedis pulses b/l. No edema of the lower extremities.  NEURO: Sensation LE Lt > Rt b/l. 5/5 strength LE b/l. Normal gait and gross cerebellar functioning.  MSK: Bruising noted above right knee. No joint erythema or obvious deformities. Spine without obvious deformity. No midline or paraspinal tenderness. FROM w/o pain of upper extremities, b/l hip joint, Lt Lower extremity, and Rt ankle. FROM w/ pain Right knee and TTP Right patella and Rt proximal tibial tuberosity. No laxity of rt knee though pain with varus/valgus/anterior draw and posterior draw.  UE & LE compartments soft and compressible b/l.

## 2023-10-05 NOTE — ED PROVIDER NOTE - OBJECTIVE STATEMENT
58 yr old female with PMHx of TB on Rifampin, Fibroids, Vulvar Ca, DM, HLD, HTN presents s/p fall and trauma to her right knee. She was walking in a hallway at work, flat floor, not slippery, and somehow her foot got stuck against the floor and she fell forward landing on her hands and right knee. She denies feeling dizzy before or after the fall, LOC, or hitting her head. Not on blood thinners. Patient can bear weight on right leg though she limps when she walks. No HA/ Dizziness, No fever/chills, No chest pain/palpitations, no SOB/cough/wheeze/stridor, No abdominal pain, No N/V/D, no dysuria/frequency/discharge, No neck/back pain, no rash, no changes in neurological status/function.

## 2023-10-05 NOTE — ED PROVIDER NOTE - ATTENDING APP SHARED VISIT CONTRIBUTION OF CARE
I have personally performed a history and physical examination of the patient and discussed management with the MARLA as well as the patient.  I reviewed the MARLA's note and agree with the documented findings and plan of care.  I have authored and modified critical sections of the Provider Note, including but not limited to HPI, Physical Exam and MDM.    58 yr old female with PMHx of TB on Rifampin, Fibroids, Vulvar Ca, DM, HLD, HTN presents s/p fall and trauma to her right knee. Mechanical trip and fall. No prodromal symptoms. pe R Knee: Anterior/Lachman's/posterior drawer tests negative, no pain or laxity on valgus or varus pressure, knee not ballotable, no tenderness at superior insertion of patella, TTP over mid patella and tibial tuberosity, no tenderness at medial/lateral joint lines. Patient can bear weight on right leg. Will obtain xrays and symptomatic control prn. Mild R wrist pain without local TTP, declined xray. Likely outpatient follow up.

## 2023-10-05 NOTE — ED PROVIDER NOTE - NSFOLLOWUPINSTRUCTIONS_ED_ALL_ED_FT
- Please follow up with your Primary Care Doctor in 2-3 days, bring a copy of your results to follow up appointment.     - Please rest, stay hydrated and continue all at home medications as previously prescribed.    -For Pain  Tylenol (acetaminophen) 1000mg every 6-8 hours as needed and/or over the counter Motrin (Ibuprofen/Advil) 400-600mg every 6 hours as needed, take with food.     -For knee  REST, ICE, ELEVATE, COMPRESS    - Return to ED for any concern listed below:  fever, weakness, increased numbness, inability to bear weight, increased pain, inability to walk or any new or worsened symptoms

## 2023-10-05 NOTE — ED PROVIDER NOTE - PATIENT PORTAL LINK FT
You can access the FollowMyHealth Patient Portal offered by [facility] by registering at the following website: [email or web address]. By joining [facility]’s FollowMyHealth portal, you will also be able to view your health information using other applications (apps) compatible with our system.

## 2023-10-05 NOTE — ED PROVIDER NOTE - CLINICAL SUMMARY MEDICAL DECISION MAKING FREE TEXT BOX
58 yr old female with PMHx of TB on Rifampin, Fibroids, Vulvar Ca, DM, HLD, HTN presents s/p fall w/ trauma to rt knee. No LOC or dizziness/ lightheadedness before or after the fall. FROM all extremities and hip joints except for pain w/ FROM Rt knew. No laxity of joint. Bruising and TTP above rt knee. Will order xray rt knee and tib fib and pain control. Dispo: Likely home.

## 2023-10-06 ENCOUNTER — EMERGENCY (EMERGENCY)
Facility: HOSPITAL | Age: 58
LOS: 1 days | Discharge: HOME | End: 2023-10-06
Attending: EMERGENCY MEDICINE
Payer: COMMERCIAL

## 2023-10-06 VITALS
RESPIRATION RATE: 18 BRPM | HEART RATE: 64 BPM | TEMPERATURE: 98 F | SYSTOLIC BLOOD PRESSURE: 146 MMHG | OXYGEN SATURATION: 95 % | DIASTOLIC BLOOD PRESSURE: 85 MMHG

## 2023-10-06 VITALS
TEMPERATURE: 98 F | SYSTOLIC BLOOD PRESSURE: 135 MMHG | DIASTOLIC BLOOD PRESSURE: 78 MMHG | WEIGHT: 216.93 LBS | OXYGEN SATURATION: 99 % | HEIGHT: 66 IN | RESPIRATION RATE: 16 BRPM | HEART RATE: 73 BPM

## 2023-10-06 DIAGNOSIS — Z98.891 HISTORY OF UTERINE SCAR FROM PREVIOUS SURGERY: Chronic | ICD-10-CM

## 2023-10-06 PROCEDURE — 99284 EMERGENCY DEPT VISIT MOD MDM: CPT

## 2023-10-06 PROCEDURE — 72100 X-RAY EXAM L-S SPINE 2/3 VWS: CPT | Mod: 26

## 2023-10-06 PROCEDURE — 96372 THER/PROPH/DIAG INJ SC/IM: CPT

## 2023-10-06 PROCEDURE — 72100 X-RAY EXAM L-S SPINE 2/3 VWS: CPT

## 2023-10-06 PROCEDURE — 99283 EMERGENCY DEPT VISIT LOW MDM: CPT

## 2023-10-06 RX ORDER — KETOROLAC TROMETHAMINE 30 MG/ML
30 SYRINGE (ML) INJECTION ONCE
Refills: 0 | Status: DISCONTINUED | OUTPATIENT
Start: 2023-10-06 | End: 2023-10-06

## 2023-10-06 RX ORDER — METHOCARBAMOL 500 MG/1
1500 TABLET, FILM COATED ORAL ONCE
Refills: 0 | Status: COMPLETED | OUTPATIENT
Start: 2023-10-06 | End: 2023-10-06

## 2023-10-06 RX ADMIN — METHOCARBAMOL 1500 MILLIGRAM(S): 500 TABLET, FILM COATED ORAL at 14:28

## 2023-10-06 RX ADMIN — Medication 30 MILLIGRAM(S): at 14:28

## 2023-10-06 NOTE — ED PROVIDER NOTE - ATTENDING APP SHARED VISIT CONTRIBUTION OF CARE
MSK low back pain, no midline ttp; no neuro deficits; xray negative; improved with ED treatment; agree with acp plan of care    This was a shared visit with MARLA. I reviewed and verified the documentation and independently performed the documented history/exam/mdm.

## 2023-10-06 NOTE — ED PROVIDER NOTE - CLINICAL SUMMARY MEDICAL DECISION MAKING FREE TEXT BOX
58-year-old female presents to the ED complaining of back pain x2 days. Patient given meds in the ED reports significant relief of presenting symptoms.  Patient able stand and ambulate without any difficulty.  X-rays reviewed showed no acute pathology.  Patient stable for discharge spine follow-up as needed.

## 2023-10-06 NOTE — ED PROVIDER NOTE - MUSCULOSKELETAL, MLM
Spine appears normal, range of motion is not limited, no muscle or joint tenderness. No midline C/T/L spine TTP. + paraspinal lumbar TTP.

## 2023-10-06 NOTE — ED PROVIDER NOTE - OBJECTIVE STATEMENT
58-year-old female presents to the ED complaining of back pain x2 days.  Patient states that she was seen in the ED yesterday status post fall for right knee pain and was sent home.  She states that later on the night she started feeling back pain and would like to come to the ED to get checked out. Pt otherwise denies fever/chills, c/p, sob, abd pain, n/v/c/d, dysuria, numbness/tingling/weakness, saddle anesthesia, urinary/bowel dysfunction and has no other complaints at this time.

## 2023-10-06 NOTE — ED ADULT TRIAGE NOTE - CHIEF COMPLAINT QUOTE
Pt had a fall at work yesterday morning and was seen here yesterday for right knee pain related to fall. This morning she woke up with lower back pain and would like it checked out.

## 2023-10-06 NOTE — ED ADULT NURSE NOTE - OBJECTIVE STATEMENT
Pt received A&Ox4 in ST c/o lower back and R knee pain s/p slip and mechanical fall yesterday at work. Pt states she fell forwards landing on her knees and thinks her back was injured from impact. + ROM with purpose. Respirations even & unlabored. NAD. Pt made aware of plan of care and verbalized understanding.

## 2023-10-06 NOTE — ED PROVIDER NOTE - PATIENT PORTAL LINK FT
You can access the FollowMyHealth Patient Portal offered by [facility] by registering at the following website: [email or web address]. By joining [facility]’s FollowMyHealth portal, you will also be able to view your health information using other applications (apps) compatible with our system.

## 2023-10-06 NOTE — ED ADULT NURSE NOTE - NSFALLRISKINTERV_ED_ALL_ED

## 2023-10-10 ENCOUNTER — NON-APPOINTMENT (OUTPATIENT)
Age: 58
End: 2023-10-10

## 2023-12-11 NOTE — ASU PREOP CHECKLIST - WEIGHT IN KG
Physical Therapy Visit    Visit Type: Daily Treatment Note  Visit: 6  Referring Provider: Katy New PA-C  Medical Diagnosis (from order): S82.141A - Closed fracture of right tibial plateau, initial encounter     SUBJECTIVE                                                                                                               Patient reports feeling okay today, and felt good after last visit. Reports that she is having great toe pain on her right foot, COLIN unknown. States that it was painful and did swell, but has started to subside.       OBJECTIVE                                                                                                                                       Treatment     Therapeutic Exercise      Patient was instructed in parameters and technique with specific demonstration of the following activities to develop strength, endurance, range of motion, and muscle/joint flexibility to improve function, progress towards goals, and maximize independence   · Exercises:    - Active Straight Leg Raise with Quad Set, 10x- 3x  - Supine Gluteal Sets, 10x -3x  - Supine Quad Set, 10x -3x  - Short Arc Quad, 10x -3x  - Clam Shells with blue band, 10x -3x    - Supine Knee Extension, 10x -3x  - Heel slides, 10x -3x  - Ankle 4 way, bilateral with blue band  - Single leg bridge (on left lower extremity), 10x -3x  - Deadbugs, 10x -2x  - Posterior pelvic tilt   - Single leg sit to stand, 10x   - Hamstring curls with red ball   - Single leg balance for 30 seconds, 3x    ADDED  - Single leg balance on airex pad progressing to eyes closed    Skilled input: verbal instruction/cues and tactile instruction/cues    Writer verbally educated and received verbal consent for hand placement, positioning of patient, and techniques to be performed today from patient for clothing adjustments for techniques, therapist position for techniques and hand placement and palpation for techniques as described above and how they  are pertinent to the patient's plan of care.  Home Exercise Program  Access Code: [email or web address]  URL: [email or web address]  Date: 12/05/2023  Prepared by: Lorri Mata    Exercises  - Active Straight Leg Raise with Quad Set  - 1 x daily - 7 x weekly - 3 sets - 10 reps  - Supine Gluteal Sets  - 1 x daily - 7 x weekly - 3 sets - 10 reps  - Supine Quad Set  - 1 x daily - 7 x weekly - 3 sets - 10 reps  - Short Arc Quad with Ankle Weight  - 1 x daily - 7 x weekly - 3 sets - 10 reps  - Clam  - 1 x daily - 7 x weekly - 3 sets - 10 reps  - Seated Long Arc Quad  - 1 x daily - 7 x weekly - 3 sets - 10 reps  - Supine Knee Extension Mobilization with Weight  - 1 x daily - 7 x weekly - 3 sets - 10 reps  - Supine Ankle Pumps  - 1 x daily - 7 x weekly - 3 sets - 10 reps  - Single Leg Sit to Stand with Arms Extended  - 1 x daily - 7 x weekly - 3 sets - 10 reps  - Supine March with Posterior Pelvic Tilt  - 1 x daily - 7 x weekly - 3 sets - 10 reps      ASSESSMENT                                                                                                            Patient tolerated session well today. Patient did not have any increase in symptoms in knee or toe during exercises. Patient did have difficulty with balance on airex pad with eyes closed. Continued skilled services are necessary to address above impairments.       Education:   - Results of above outlined education: Verbalizes understanding and Demonstrates understanding       Therapy procedure time and total treatment time can be found documented on the Time Entry flowsheet     88.5

## 2023-12-26 NOTE — ASU DISCHARGE PLAN (ADULT/PEDIATRIC) - DIET/FLUID RESTRICTION
Pt sts two weeks of shortness of breath and cough. Sts sometimes she coughs up black mucous. No fever, body aches, or chills.
No change

## 2024-05-01 RX ORDER — RIFAMPIN 300 MG
2 CAPSULE ORAL
Qty: 0 | Refills: 0 | DISCHARGE

## 2024-05-01 RX ORDER — METFORMIN HYDROCHLORIDE 850 MG/1
1 TABLET ORAL
Qty: 0 | Refills: 0 | DISCHARGE

## 2024-05-01 RX ORDER — ATORVASTATIN CALCIUM 80 MG/1
1 TABLET, FILM COATED ORAL
Qty: 0 | Refills: 0 | DISCHARGE

## 2024-05-01 RX ORDER — VALSARTAN 80 MG/1
1 TABLET ORAL
Qty: 0 | Refills: 0 | DISCHARGE

## 2024-05-01 RX ORDER — MONTELUKAST 4 MG/1
0 TABLET, CHEWABLE ORAL
Qty: 0 | Refills: 0 | DISCHARGE

## 2024-05-01 RX ORDER — INDOMETHACIN 50 MG
1 CAPSULE ORAL
Qty: 0 | Refills: 0 | DISCHARGE

## 2024-05-01 RX ORDER — INSULIN GLARGINE 100 [IU]/ML
0 INJECTION, SOLUTION SUBCUTANEOUS
Qty: 0 | Refills: 1 | DISCHARGE

## 2024-08-17 ENCOUNTER — NON-APPOINTMENT (OUTPATIENT)
Age: 59
End: 2024-08-17